# Patient Record
(demographics unavailable — no encounter records)

---

## 2025-02-07 NOTE — HISTORY OF PRESENT ILLNESS
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

## 2025-02-07 NOTE — PROCEDURE
[FreeTextEntry1] : Compliance report November 2024 to February 20 2599% of days used 96% of days greater than 4 hours.  AHI 1.6.  There is excellent compliance.  Pulmonary function test 2/7/2025 moderate obstructive airways disease with a bronchodilator response.

## 2025-02-07 NOTE — DISCUSSION/SUMMARY
[FreeTextEntry1] : Mr. Henson  has severe obstructive sleep apnea.  He is using machine and feeling well.  His AHI has decreased from 99-1.6.  Will continue all therapy.  He has been using the Breo sporadically with rare uses of the albuterol.  This is not the proper way to use maintenance therapy.  I have told him I prefer he do not use the Breo and use the albuterol on an as-needed basis.  We will then gauge how often he uses the albuterol and if it is several times a week he will need maintenance therapy.  This all been discussed with the patient he understands and agrees. The patient understands and agrees with plan of care. Today's office visit encompassed 32 minutes which excludes teaching and separately reported services.. I conducted an extensive history, physical exam and reviewed diagnosis and treatment options including diagnostic tests,radiology studies including cat scans and the use of prescription medication.

## 2025-02-07 NOTE — REASON FOR VISIT
[Follow-Up] : a follow-up visit [Sleep Apnea] : sleep apnea [Obesity] : obesity [TextEntry] : Patient states his breathing has been stable on the Breo.  He is using his CPAP every night and his quality of sleep is improving.  He does have to get up a few times a night.